# Patient Record
Sex: MALE | Race: OTHER | HISPANIC OR LATINO | Employment: OTHER | URBAN - METROPOLITAN AREA
[De-identification: names, ages, dates, MRNs, and addresses within clinical notes are randomized per-mention and may not be internally consistent; named-entity substitution may affect disease eponyms.]

---

## 2022-06-02 ENCOUNTER — OFFICE VISIT (OUTPATIENT)
Dept: URGENT CARE | Facility: CLINIC | Age: 47
End: 2022-06-02

## 2022-06-02 VITALS
DIASTOLIC BLOOD PRESSURE: 85 MMHG | RESPIRATION RATE: 18 BRPM | HEART RATE: 68 BPM | HEIGHT: 65 IN | WEIGHT: 165.81 LBS | SYSTOLIC BLOOD PRESSURE: 122 MMHG | BODY MASS INDEX: 27.63 KG/M2 | OXYGEN SATURATION: 98 % | TEMPERATURE: 98 F

## 2022-06-02 DIAGNOSIS — K29.70 GASTRITIS, PRESENCE OF BLEEDING UNSPECIFIED, UNSPECIFIED CHRONICITY, UNSPECIFIED GASTRITIS TYPE: Primary | ICD-10-CM

## 2022-06-02 PROCEDURE — 99213 PR OFFICE/OUTPT VISIT, EST, LEVL III, 20-29 MIN: ICD-10-PCS | Mod: ,,, | Performed by: FAMILY MEDICINE

## 2022-06-02 PROCEDURE — 99213 OFFICE O/P EST LOW 20 MIN: CPT | Mod: ,,, | Performed by: FAMILY MEDICINE

## 2022-06-02 RX ORDER — METFORMIN HYDROCHLORIDE 500 MG/1
500 TABLET ORAL 2 TIMES DAILY WITH MEALS
COMMUNITY

## 2022-06-02 RX ORDER — SUCRALFATE 1 G/1
1 TABLET ORAL 4 TIMES DAILY
Qty: 56 TABLET | Refills: 0 | Status: SHIPPED | OUTPATIENT
Start: 2022-06-02 | End: 2022-06-16

## 2022-06-02 RX ORDER — LISINOPRIL 5 MG/1
5 TABLET ORAL DAILY
COMMUNITY

## 2022-06-02 RX ORDER — PANTOPRAZOLE SODIUM 40 MG/1
40 TABLET, DELAYED RELEASE ORAL 2 TIMES DAILY
Qty: 28 TABLET | Refills: 0 | Status: SHIPPED | OUTPATIENT
Start: 2022-06-02 | End: 2022-06-16

## 2022-06-02 RX ORDER — EZETIMIBE 10 MG/1
10 TABLET ORAL DAILY
COMMUNITY

## 2022-06-02 RX ORDER — FENOFIBRATE 48 MG/1
48 TABLET, FILM COATED ORAL DAILY
COMMUNITY

## 2022-06-02 NOTE — PATIENT INSTRUCTIONS
Likely you have inflammation of the stomach from your diet.  Medications sent to pharmacy.  As discussed, avoid the acidic foods and drinks that we discussed.  Avoid Advil Aleve Motrin ibuprofen medications for now.  We will coffee, sodas, and energy drinks or anything with caffeine in it.  If your belly pain worsens go to the emergency department.

## 2022-06-02 NOTE — PROGRESS NOTES
"Subjective:       Patient ID: Russell Joshua is a 46 y.o. male.    Vitals:  height is 5' 5" (1.651 m) and weight is 75.2 kg (165 lb 12.8 oz). His temperature is 98.3 °F (36.8 °C). His blood pressure is 122/85 and his pulse is 68. His respiration is 18 and oxygen saturation is 98%.     Chief Complaint: Diarrhea    X 1 week- diarrhea, abdominal pain, lower back pain.   X yesterday- nausea  Pt states he changed diet in December, has been eating unhealthy meals lately.     Patient is complaining of a one-week history of upper abdominal discomfort nausea and diarrhea.  States his doctor told him to eat a healthier diet and to avoid acidic foods and drinks.  States lately he has been eating unhealthy again this is when the symptoms started.  Also has been taking Advil.  Patient denies any vomiting.  States the pain in his upper left belly area.  Some belching but also feels some reflux as well.  Has been eating a lot of tomatoes and fried food and spicy foods recently.    Diarrhea   This is a new problem. The current episode started in the past 7 days. The problem occurs 5 to 10 times per day. The problem has been gradually worsening. The patient states that diarrhea does not awaken him from sleep. Associated symptoms include abdominal pain. Associated symptoms comments: Nausea . He has tried nothing for the symptoms.       Constitution: Negative.   HENT: Negative.    Neck: neck negative.   Cardiovascular: Negative.    Eyes: Negative.    Respiratory: Negative.    Gastrointestinal: Positive for abdominal pain and diarrhea.   Genitourinary: Negative.    Musculoskeletal: Negative.    Skin: Negative.    Allergic/Immunologic: Negative.    Neurological: Negative.    Hematologic/Lymphatic: Negative.        Objective:      Physical Exam   Constitutional: He is oriented to person, place, and time.   HENT:   Ears:   Right Ear: External ear normal.   Left Ear: External ear normal.   Mouth/Throat: Oropharynx is clear.   Eyes: " "Conjunctivae are normal.   Abdominal: Normal appearance and bowel sounds are normal. He exhibits no distension and no mass. Soft. There is no abdominal tenderness. There is no rebound and no guarding.   Neurological: He is alert and oriented to person, place, and time.   Psychiatric: His behavior is normal. Mood, judgment and thought content normal.   Vitals reviewed.         Previous History      Review of patient's allergies indicates:   Allergen Reactions    Tylenol [acetaminophen]        Past Medical History:   Diagnosis Date    Essential (primary) hypertension     High cholesterol     Type 2 diabetes mellitus without complications      Current Outpatient Medications   Medication Instructions    ezetimibe (ZETIA) 10 mg, Oral, Daily    fenofibrate (TRICOR) 48 mg, Oral, Daily    lisinopriL (PRINIVIL,ZESTRIL) 5 mg, Oral, Daily    metFORMIN (GLUCOPHAGE) 500 mg, Oral, 2 times daily with meals    pantoprazole (PROTONIX) 40 mg, Oral, 2 times daily    sucralfate (CARAFATE) 1 g, Oral, 4 times daily     History reviewed. No pertinent surgical history.  Family History   Problem Relation Age of Onset    Diabetes Father        Social History     Tobacco Use    Smoking status: Never Smoker    Smokeless tobacco: Never Used   Substance Use Topics    Alcohol use: Not Currently        Physical Exam      Vital Signs Reviewed   /85   Pulse 68   Temp 98.3 °F (36.8 °C)   Resp 18   Ht 5' 5" (1.651 m)   Wt 75.2 kg (165 lb 12.8 oz)   SpO2 98%   BMI 27.59 kg/m²        Procedures    Procedures     Labs   No results found for this or any previous visit.      Assessment:       1. Gastritis, presence of bleeding unspecified, unspecified chronicity, unspecified gastritis type          Plan:       Likely you have inflammation of the stomach from your diet.  Medications sent to pharmacy.  As discussed, avoid the acidic foods and drinks that we discussed.  Avoid Advil Aleve Motrin ibuprofen medications for now.  We " will coffee, sodas, and energy drinks or anything with caffeine in it.  If your belly pain worsens go to the emergency department.    Gastritis, presence of bleeding unspecified, unspecified chronicity, unspecified gastritis type    Other orders  -     pantoprazole (PROTONIX) 40 MG tablet; Take 1 tablet (40 mg total) by mouth 2 (two) times daily. for 14 days  Dispense: 28 tablet; Refill: 0  -     sucralfate (CARAFATE) 1 gram tablet; Take 1 tablet (1 g total) by mouth 4 (four) times daily. for 14 days  Dispense: 56 tablet; Refill: 0